# Patient Record
Sex: MALE | Race: OTHER | Employment: PART TIME | URBAN - METROPOLITAN AREA
[De-identification: names, ages, dates, MRNs, and addresses within clinical notes are randomized per-mention and may not be internally consistent; named-entity substitution may affect disease eponyms.]

---

## 2021-11-07 ENCOUNTER — APPOINTMENT (OUTPATIENT)
Dept: CT IMAGING | Age: 21
End: 2021-11-07
Payer: COMMERCIAL

## 2021-11-07 ENCOUNTER — HOSPITAL ENCOUNTER (EMERGENCY)
Age: 21
Discharge: HOME | End: 2021-11-07
Attending: EMERGENCY MEDICINE
Payer: COMMERCIAL

## 2021-11-07 VITALS
WEIGHT: 165 LBS | HEIGHT: 70 IN | BODY MASS INDEX: 23.62 KG/M2 | DIASTOLIC BLOOD PRESSURE: 78 MMHG | SYSTOLIC BLOOD PRESSURE: 137 MMHG | OXYGEN SATURATION: 94 % | RESPIRATION RATE: 14 BRPM | HEART RATE: 88 BPM | TEMPERATURE: 98.7 F

## 2021-11-07 DIAGNOSIS — R11.2 NON-INTRACTABLE VOMITING WITH NAUSEA, UNSPECIFIED VOMITING TYPE: ICD-10-CM

## 2021-11-07 DIAGNOSIS — R51.9 ACUTE NONINTRACTABLE HEADACHE, UNSPECIFIED HEADACHE TYPE: Primary | ICD-10-CM

## 2021-11-07 LAB
A/G RATIO: 1.6 (ref 1.1–2.2)
ALBUMIN SERPL-MCNC: 5.1 G/DL (ref 3.4–5)
ALP BLD-CCNC: 75 U/L (ref 40–129)
ALT SERPL-CCNC: 27 U/L (ref 10–40)
AMPHETAMINE SCREEN, URINE: POSITIVE
ANION GAP SERPL CALCULATED.3IONS-SCNC: 13 MMOL/L (ref 3–16)
AST SERPL-CCNC: 21 U/L (ref 15–37)
BACTERIA: ABNORMAL /HPF
BARBITURATE SCREEN URINE: ABNORMAL
BASE EXCESS VENOUS: 2.1 MMOL/L (ref -3–3)
BASOPHILS ABSOLUTE: 0 K/UL (ref 0–0.2)
BASOPHILS RELATIVE PERCENT: 0.1 %
BENZODIAZEPINE SCREEN, URINE: ABNORMAL
BILIRUB SERPL-MCNC: 0.6 MG/DL (ref 0–1)
BILIRUBIN URINE: NEGATIVE
BLOOD, URINE: NEGATIVE
BUN BLDV-MCNC: 19 MG/DL (ref 7–20)
CALCIUM SERPL-MCNC: 10.3 MG/DL (ref 8.3–10.6)
CANNABINOID SCREEN URINE: ABNORMAL
CHLORIDE BLD-SCNC: 101 MMOL/L (ref 99–110)
CLARITY: CLEAR
CO2: 25 MMOL/L (ref 21–32)
COCAINE METABOLITE SCREEN URINE: ABNORMAL
COLOR: YELLOW
CREAT SERPL-MCNC: 0.8 MG/DL (ref 0.9–1.3)
EOSINOPHILS ABSOLUTE: 0 K/UL (ref 0–0.6)
EOSINOPHILS RELATIVE PERCENT: 0 %
EPITHELIAL CELLS, UA: ABNORMAL /HPF (ref 0–5)
GFR AFRICAN AMERICAN: >60
GFR NON-AFRICAN AMERICAN: >60
GLUCOSE BLD-MCNC: 97 MG/DL (ref 70–99)
GLUCOSE URINE: NEGATIVE MG/DL
HCO3 VENOUS: 27.2 MMOL/L (ref 23–29)
HCT VFR BLD CALC: 44.6 % (ref 40.5–52.5)
HEMOGLOBIN: 15.8 G/DL (ref 13.5–17.5)
KETONES, URINE: >=80 MG/DL
LEUKOCYTE ESTERASE, URINE: NEGATIVE
LYMPHOCYTES ABSOLUTE: 1.1 K/UL (ref 1–5.1)
LYMPHOCYTES RELATIVE PERCENT: 11.2 %
Lab: ABNORMAL
MCH RBC QN AUTO: 29.4 PG (ref 26–34)
MCHC RBC AUTO-ENTMCNC: 35.4 G/DL (ref 31–36)
MCV RBC AUTO: 83.2 FL (ref 80–100)
METHADONE SCREEN, URINE: ABNORMAL
MICROSCOPIC EXAMINATION: YES
MONOCYTES ABSOLUTE: 0.6 K/UL (ref 0–1.3)
MONOCYTES RELATIVE PERCENT: 5.7 %
MUCUS: ABNORMAL /LPF
NEUTROPHILS ABSOLUTE: 8.3 K/UL (ref 1.7–7.7)
NEUTROPHILS RELATIVE PERCENT: 83 %
NITRITE, URINE: NEGATIVE
O2 SAT, VEN: 65 %
O2 THERAPY: NORMAL
OPIATE SCREEN URINE: ABNORMAL
OXYCODONE URINE: ABNORMAL
PCO2, VEN: 45.8 MMHG (ref 40–50)
PDW BLD-RTO: 12.7 % (ref 12.4–15.4)
PH UA: 6.5
PH UA: 6.5 (ref 5–8)
PH VENOUS: 7.38 (ref 7.35–7.45)
PHENCYCLIDINE SCREEN URINE: ABNORMAL
PLATELET # BLD: 259 K/UL (ref 135–450)
PMV BLD AUTO: 7.9 FL (ref 5–10.5)
PO2, VEN: 34.6 MMHG (ref 25–40)
POTASSIUM REFLEX MAGNESIUM: 4.1 MMOL/L (ref 3.5–5.1)
PROPOXYPHENE SCREEN: ABNORMAL
PROTEIN UA: ABNORMAL MG/DL
RBC # BLD: 5.37 M/UL (ref 4.2–5.9)
RBC UA: ABNORMAL /HPF (ref 0–4)
SODIUM BLD-SCNC: 139 MMOL/L (ref 136–145)
SPECIFIC GRAVITY UA: 1.02 (ref 1–1.03)
TCO2 CALC VENOUS: 28 MMOL/L
TOTAL PROTEIN: 8.3 G/DL (ref 6.4–8.2)
URINE TYPE: ABNORMAL
UROBILINOGEN, URINE: 0.2 E.U./DL
WBC # BLD: 10 K/UL (ref 4–11)
WBC UA: ABNORMAL /HPF (ref 0–5)

## 2021-11-07 PROCEDURE — 2580000003 HC RX 258: Performed by: EMERGENCY MEDICINE

## 2021-11-07 PROCEDURE — 85025 COMPLETE CBC W/AUTO DIFF WBC: CPT

## 2021-11-07 PROCEDURE — 81001 URINALYSIS AUTO W/SCOPE: CPT

## 2021-11-07 PROCEDURE — 80053 COMPREHEN METABOLIC PANEL: CPT

## 2021-11-07 PROCEDURE — 99282 EMERGENCY DEPT VISIT SF MDM: CPT

## 2021-11-07 PROCEDURE — 96374 THER/PROPH/DIAG INJ IV PUSH: CPT

## 2021-11-07 PROCEDURE — 70450 CT HEAD/BRAIN W/O DYE: CPT

## 2021-11-07 PROCEDURE — 6360000002 HC RX W HCPCS: Performed by: EMERGENCY MEDICINE

## 2021-11-07 PROCEDURE — 82803 BLOOD GASES ANY COMBINATION: CPT

## 2021-11-07 PROCEDURE — 80307 DRUG TEST PRSMV CHEM ANLYZR: CPT

## 2021-11-07 RX ORDER — METOCLOPRAMIDE HYDROCHLORIDE 5 MG/ML
10 INJECTION INTRAMUSCULAR; INTRAVENOUS ONCE
Status: COMPLETED | OUTPATIENT
Start: 2021-11-07 | End: 2021-11-07

## 2021-11-07 RX ORDER — 0.9 % SODIUM CHLORIDE 0.9 %
1000 INTRAVENOUS SOLUTION INTRAVENOUS ONCE
Status: COMPLETED | OUTPATIENT
Start: 2021-11-07 | End: 2021-11-07

## 2021-11-07 RX ADMIN — SODIUM CHLORIDE 1000 ML: 9 INJECTION, SOLUTION INTRAVENOUS at 16:33

## 2021-11-07 RX ADMIN — METOCLOPRAMIDE 10 MG: 5 INJECTION, SOLUTION INTRAMUSCULAR; INTRAVENOUS at 16:33

## 2021-11-07 ASSESSMENT — ENCOUNTER SYMPTOMS
COUGH: 0
PHOTOPHOBIA: 0
VOMITING: 1
EYE PAIN: 0
NAUSEA: 1
DIARRHEA: 0
EYE REDNESS: 0
ABDOMINAL PAIN: 0
SHORTNESS OF BREATH: 0

## 2021-11-07 NOTE — Clinical Note
Yanira Nuria accompanied Cecy Tilley to the emergency department on 11/7/2021. They may return to work on 11/08/2021. If you have any questions or concerns, please don't hesitate to call.       Power Jorge MD

## 2021-11-07 NOTE — ED TRIAGE NOTES
Reports has bloody nose and head Ache a week and half ago s/s ongoing now reports fatigue, blurry vision, confusion. Drinking last night reports more than a hang over today also has random groin pain.  NV today

## 2021-11-08 NOTE — ED PROVIDER NOTES
Emergency Department Provider Note  Location: [address] [place] [address]  11/7/2021     Patient Identification  Stephenie Dickens is a 24 y.o. male    Chief Complaint  Headache      Mode of Arrival  private car    HPI  (History provided by patient)  This is a 24 y.o. male presented today for headache. For 1.5 weeks, patient has had intermittent headache, blurry vision and confusion. He gave an example where he went to [employer] on Tuesday and walked out of there with only 2 items but that shopping trip took him 45 minutes. He knows the store well and would normally be in and out if he was only buying 2 items. He felt like he was just wandering in the store which is not normal for him. He takes Adderall for ADHD and denies medication changes. He states prior to onset of the HA, he had a \"chest cold\" and severe cough. Those URI symptoms resolved and then HA and confusion started. Patient said there was a gathering last night and drank a lot of alcohol. He started vomiting overnight and this morning felt very hung over. He woke up with a headache and continues to vomit. Patient states he gets headache often but they are not usually this severe. Denies drugs. He also states for the past 1.5 weeks, he has had 3 episode of epistaxis, all on the right side. Patient also mentioned that he has had left testicular pain off and on for months. He states the episode for last a few seconds and this occurs about once a month. No penile discharge. No urinary symptoms. Denies pain ever lasting more than 15 seconds. ROS  Review of Systems   Constitutional: Positive for fatigue. Negative for chills and fever. HENT: Positive for nosebleeds. Negative for congestion. Eyes: Positive for visual disturbance. Negative for photophobia, pain and redness. Respiratory: Negative for cough and shortness of breath. Cardiovascular: Negative for chest pain. Gastrointestinal: Positive for nausea and vomiting.  Negative for abdominal pain and diarrhea. Genitourinary: Negative for dysuria and frequency. Musculoskeletal: Negative for neck pain. Skin: Negative for rash. Neurological: Positive for headaches. Negative for syncope and speech difficulty. Psychiatric/Behavioral: Positive for confusion. I have reviewed the following nursing documentation:  Allergies: No Known Allergies    Past medical history:  has a past medical history of ADHD and Seizures ([facility] [address] Past surgical history:  has no past surgical history on file. Home medications: Adderall     Social history:  reports that he has never smoked. He has never used smokeless tobacco. He reports previous alcohol use. He reports current drug use. Drug: Marijuana [place]). Family history:  History reviewed. No pertinent family history. Exam  ED Triage Vitals   BP Temp Temp Source Pulse Resp SpO2 Height Weight   11/07/21 1430 11/07/21 1430 11/07/21 1430 11/07/21 1430 11/07/21 1430 11/07/21 1430 11/07/21 1439 11/07/21 1439   (!) 144/97 98.7 °F (37.1 °C) Oral 86 14 100 % 5' 10\" (1.778 m) 165 lb (74.8 kg)   Physical Exam  Vitals and nursing note reviewed. Constitutional:       General: He is not in acute distress. Appearance: He is well-developed. He is not diaphoretic. HENT:      Head: Normocephalic and atraumatic. Eyes:      General: No visual field deficit or scleral icterus. Right eye: No discharge. Left eye: No discharge. Extraocular Movements: Extraocular movements intact. Conjunctiva/sclera: Conjunctivae normal.      Pupils: Pupils are equal, round, and reactive to light. Neck:      Trachea: No tracheal deviation. Cardiovascular:      Rate and Rhythm: Normal rate and regular rhythm. Heart sounds: Normal heart sounds. No murmur heard. Pulmonary:      Effort: Pulmonary effort is normal. No respiratory distress. Breath sounds: Normal breath sounds. No stridor. No wheezing.    Abdominal:      General: There is no distension. Palpations: Abdomen is soft. Tenderness: There is no abdominal tenderness. There is no guarding or rebound. Musculoskeletal:         General: No deformity. Cervical back: Neck supple. Right lower leg: No edema. Left lower leg: No edema. Skin:     General: Skin is warm and dry. Findings: No erythema or rash. Neurological:      Mental Status: He is alert and oriented to person, place, and time. Cranial Nerves: Cranial nerves are intact. No dysarthria or facial asymmetry. Sensory: No sensory deficit. Motor: No weakness, tremor or abnormal muscle tone. Coordination: Coordination normal.      Gait: Gait normal.   Psychiatric:         Speech: Speech normal.         Behavior: Behavior normal.           MDM/ED Course  ED Medication Orders (From admission, onward)    Start Ordered     Status Ordering Provider    11/07/21 1630 11/07/21 1621  metoclopramide (REGLAN) injection 10 mg  ONCE         Last MAR action: Given - by Jennie Zimmer on 11/07/21 at [facility]    11/07/21 1630 11/07/21 1621  0.9 % sodium chloride bolus  ONCE         Last MAR action: Stopped - by Jennie Zimmer on 11/07/21 at [address] [place] [address]          Radiology  CT HEAD WO CONTRAST    Result Date: 11/7/2021  PROCEDURE: CT HEAD WO CONTRAST INDICATION: headache x 1.5 weeks, confusion, now vomiting in the past 12 hours, stroke-like symptoms COMPARISON: none TECHNICAL FACTORS: Multiplanar contiguous spiral axial scanning  Skull base to high convexities with multi-reformatted images. CT radiation dose optimization techniques (automated exposure control, use of a iterative reconstruction techniques, or adjustment of the mA or KV according to the patients size) were used to limit patient radiation dose. FINDINGS: Noncontrast axial images reveal midline ventricles. Ventricular size and cortical sulci are normal.  No intra-axial/extra-axial masses or fluid collections.  No evidence of acute cranial hemorrhage. Posterior fossa is within normal limits Paranasal sinuses are unremarkable. Orbits: Normal Mastoids and Temporal Bones: Normal Skull base and Bony calvarium remains intact, no destructive lesions     1.  Normal brain        Labs  Results for orders placed or performed during the hospital encounter of 11/07/21   CBC Auto Differential   Result Value Ref Range    WBC 10.0 4.0 - 11.0 K/uL    RBC 5.37 4.20 - 5.90 M/uL    Hemoglobin 15.8 13.5 - 17.5 g/dL    Hematocrit 44.6 40.5 - 52.5 %    MCV 83.2 80.0 - 100.0 fL    MCH 29.4 26.0 - 34.0 pg    MCHC 35.4 31.0 - 36.0 g/dL    RDW 12.7 12.4 - 15.4 %    Platelets 744 047 - 847 K/uL    MPV 7.9 5.0 - 10.5 fL    Neutrophils % 83.0 %    Lymphocytes % 11.2 %    Monocytes % 5.7 %    Eosinophils % 0.0 %    Basophils % 0.1 %    Neutrophils Absolute 8.3 (H) 1.7 - 7.7 K/uL    Lymphocytes Absolute 1.1 1.0 - 5.1 K/uL    Monocytes Absolute 0.6 0.0 - 1.3 K/uL    Eosinophils Absolute 0.0 0.0 - 0.6 K/uL    Basophils Absolute 0.0 0.0 - 0.2 K/uL   Comprehensive Metabolic Panel w/ Reflex to MG   Result Value Ref Range    Sodium 139 136 - 145 mmol/L    Potassium reflex Magnesium 4.1 3.5 - 5.1 mmol/L    Chloride 101 99 - 110 mmol/L    CO2 25 21 - 32 mmol/L    Anion Gap 13 3 - 16    Glucose 97 70 - 99 mg/dL    BUN 19 7 - 20 mg/dL    CREATININE 0.8 (L) 0.9 - 1.3 mg/dL    GFR Non-African American >60 >60    GFR African American >60 >60    Calcium 10.3 8.3 - 10.6 mg/dL    Total Protein 8.3 (H) 6.4 - 8.2 g/dL    Albumin 5.1 (H) 3.4 - 5.0 g/dL    Albumin/Globulin Ratio 1.6 1.1 - 2.2    Total Bilirubin 0.6 0.0 - 1.0 mg/dL    Alkaline Phosphatase 75 40 - 129 U/L    ALT 27 10 - 40 U/L    AST 21 15 - 37 U/L   Urinalysis, reflex to microscopic   Result Value Ref Range    Color, UA Yellow Straw/Yellow    Clarity, UA Clear Clear    Glucose, Ur Negative Negative mg/dL    Bilirubin Urine Negative Negative    Ketones, Urine >=80 (A) Negative mg/dL    Specific Gravity, UA 1.025 1.005 - did not show acute pathology. We discussed the risks and benefits of LP and shared decision making was made. We agreed that LP is not likely to be beneficial at this time. Patient also felt better and was ready to go home. - Patient advised to f/u with PCP and mother present. She agrees to take the patient to their PCP in [place]. - Return precautions also discussed. patient verbalized understanding of care plan and agreed to follow-up with PCP as advised. I estimate there is LOW risk for SUBARACHNOID HEMORRHAGE, MENINGITIS, INTRACRANIAL HEMORRHAGE, SUBDURAL HEMATOMA, OR STROKE, thus I consider the discharge disposition reasonable. Wing Travis and I have discussed the diagnosis and risks, and we agree with discharging home to follow-up with their primary doctor. We also discussed returning to the Emergency Department immediately if new or worsening symptoms occur. We have discussed the symptoms which are most concerning (e.g., changing or worsening pain, weakness, vomiting, fever) that necessitate immediate return. Clinical Impression:  1. Acute nonintractable headache, unspecified headache type    2. Non-intractable vomiting with nausea, unspecified vomiting type          Disposition:  Discharge to home in good condition. Blood pressure 137/78, pulse 88, temperature 98.7 °F (37.1 °C), temperature source Oral, resp. rate 14, height 5' 10\" (1.778 m), weight 165 lb (74.8 kg), SpO2 94 %. Patient was given scripts for the following medications. I counseled patient how to take these medications. There are no discharge medications for this patient. Disposition referral (if applicable):   Your primary care doctor in [place]    Schedule an appointment as soon as possible for a visit in 3 days         This chart was generated in part by using Dragon Dictation system and may contain errors related to that system including errors in grammar, punctuation, and spelling, as well as words and phrases that may be inappropriate. If there are any questions or concerns please feel free to contact the dictating provider for clarification.      Eliud Roth MD  [facility] Quentin Sever, MD  11/07/21 1659

## 2024-08-28 ENCOUNTER — HOSPITAL ENCOUNTER (EMERGENCY)
Age: 24
Discharge: HOME | End: 2024-08-28
Attending: EMERGENCY MEDICINE
Payer: COMMERCIAL

## 2024-08-28 VITALS
HEIGHT: 70 IN | WEIGHT: 158.51 LBS | RESPIRATION RATE: 16 BRPM | HEART RATE: 61 BPM | OXYGEN SATURATION: 98 % | DIASTOLIC BLOOD PRESSURE: 72 MMHG | SYSTOLIC BLOOD PRESSURE: 127 MMHG | BODY MASS INDEX: 22.69 KG/M2 | TEMPERATURE: 98.9 F

## 2024-08-28 DIAGNOSIS — Z20.9 EXPOSURE TO BAT WITHOUT KNOWN BITE: Primary | ICD-10-CM

## 2024-08-28 DIAGNOSIS — Z91.89 AT INCREASED RISK FOR EXPOSURE TO RABIES VIRUS: Primary | ICD-10-CM

## 2024-08-28 DIAGNOSIS — Z23 NEED FOR PROPHYLACTIC VACCINATION AND INOCULATION AGAINST RABIES: ICD-10-CM

## 2024-08-28 PROCEDURE — 90471 IMMUNIZATION ADMIN: CPT | Performed by: EMERGENCY MEDICINE

## 2024-08-28 PROCEDURE — 90675 RABIES VACCINE IM: CPT | Performed by: EMERGENCY MEDICINE

## 2024-08-28 PROCEDURE — 6360000002 HC RX W HCPCS: Performed by: EMERGENCY MEDICINE

## 2024-08-28 PROCEDURE — 90375 RABIES IG IM/SC: CPT | Performed by: EMERGENCY MEDICINE

## 2024-08-28 PROCEDURE — 96372 THER/PROPH/DIAG INJ SC/IM: CPT

## 2024-08-28 PROCEDURE — 99284 EMERGENCY DEPT VISIT MOD MDM: CPT

## 2024-08-28 RX ORDER — ONDANSETRON 2 MG/ML
8 INJECTION INTRAMUSCULAR; INTRAVENOUS
OUTPATIENT
Start: 2024-09-01

## 2024-08-28 RX ORDER — ACETAMINOPHEN 325 MG/1
650 TABLET ORAL
OUTPATIENT
Start: 2024-09-01

## 2024-08-28 RX ORDER — DIPHENHYDRAMINE HYDROCHLORIDE 50 MG/ML
50 INJECTION INTRAMUSCULAR; INTRAVENOUS
OUTPATIENT
Start: 2024-09-01

## 2024-08-28 RX ORDER — EPINEPHRINE 1 MG/ML
0.3 INJECTION, SOLUTION INTRAMUSCULAR; SUBCUTANEOUS PRN
OUTPATIENT
Start: 2024-09-01

## 2024-08-28 RX ORDER — SODIUM CHLORIDE 9 MG/ML
INJECTION, SOLUTION INTRAVENOUS CONTINUOUS
OUTPATIENT
Start: 2024-09-01

## 2024-08-28 RX ORDER — ALBUTEROL SULFATE 90 UG/1
4 AEROSOL, METERED RESPIRATORY (INHALATION) PRN
OUTPATIENT
Start: 2024-09-01

## 2024-08-28 RX ADMIN — RABIES VACCINE 1 ML: KIT at 15:28

## 2024-08-28 RX ADMIN — RABIES IMMUNE GLOBULIN (HUMAN) 1440 UNITS: 300 INJECTION, SOLUTION INFILTRATION; INTRAMUSCULAR at 15:19

## 2024-08-28 ASSESSMENT — ENCOUNTER SYMPTOMS
SORE THROAT: 0
STRIDOR: 0
ABDOMINAL DISTENTION: 0
NAUSEA: 0
VOICE CHANGE: 0
EYE DISCHARGE: 0
EYE REDNESS: 0
COUGH: 0
VOMITING: 0
EYE PAIN: 0
EYE ITCHING: 0
ANAL BLEEDING: 0
ABDOMINAL PAIN: 0
WHEEZING: 0
CHEST TIGHTNESS: 0
BLOOD IN STOOL: 0
FACIAL SWELLING: 0
BACK PAIN: 0
SINUS PRESSURE: 0
TROUBLE SWALLOWING: 0
CONSTIPATION: 0
RHINORRHEA: 0
SHORTNESS OF BREATH: 0
PHOTOPHOBIA: 0
DIARRHEA: 0

## 2024-08-28 ASSESSMENT — PAIN - FUNCTIONAL ASSESSMENT: PAIN_FUNCTIONAL_ASSESSMENT: NONE - DENIES PAIN

## 2024-08-28 NOTE — DISCHARGE INSTRUCTIONS
Follow up with the [facility] infusion center on Day 3, 7, and according to the schedule given for additional vaccinations.   Return if any problems or concerns.

## 2024-08-28 NOTE — ED NOTES
Faxed over rabies protocol form to the [facility]    Confirmed that they received the form.   
Instructed patient to call [facility] outpatient infusion center regarding next series of vaccines   
13-Jun-2019 19:11

## 2024-08-28 NOTE — ED PROVIDER NOTES
tinnitus, trouble swallowing and voice change.    Eyes:  Negative for photophobia, pain, discharge, redness, itching and visual disturbance.   Respiratory:  Negative for cough, chest tightness, shortness of breath, wheezing and stridor.    Cardiovascular:  Negative for chest pain, palpitations and leg swelling.   Gastrointestinal:  Negative for abdominal distention, abdominal pain, anal bleeding, blood in stool, constipation, diarrhea, nausea and vomiting.   Genitourinary:  Negative for difficulty urinating, dysuria, frequency, hematuria, penile discharge, testicular pain and urgency.   Musculoskeletal:  Negative for back pain, joint swelling, neck pain and neck stiffness.   Skin:  Negative for rash and wound.   Neurological:  Negative for dizziness, syncope, facial asymmetry, speech difficulty, weakness, numbness and headaches.   Hematological:  Does not bruise/bleed easily.   Psychiatric/Behavioral:  Negative for agitation, confusion, hallucinations, self-injury, sleep disturbance and suicidal ideas. The patient is not nervous/anxious.    All other systems reviewed and are negative.       Physical Exam  Constitutional:       General: He is not in acute distress.     Appearance: Normal appearance. He is normal weight. He is not ill-appearing.   HENT:      Head: Normocephalic and atraumatic.   Eyes:      Extraocular Movements: Extraocular movements intact.      Conjunctiva/sclera: Conjunctivae normal.      Pupils: Pupils are equal, round, and reactive to light.   Pulmonary:      Effort: Pulmonary effort is normal. No respiratory distress.   Musculoskeletal:         General: Normal range of motion.   Skin:     General: Skin is warm and dry.      Capillary Refill: Capillary refill takes less than 2 seconds.   Neurological:      General: No focal deficit present.      Mental Status: He is alert.   Psychiatric:         Mood and Affect: Mood normal.         Behavior: Behavior normal.          MDM  No results found for  this visit on 08/28/24.      I estimate there is LOW risk for CELLULITIS, COMPARTMENT SYNDROME, NECROTIZING FASCIITIS, TENDON OR NEUROVASCULAR INJURY, or FOREIGN BODY, thus I consider the discharge disposition reasonable. Also, there is no evidence or peritonitis, sepsis, or toxicity. Pino Yarbrough and I have discussed the diagnosis and risks, and we agree with discharging home to follow-up with their primary doctor. We also discussed returning to the Emergency Department immediately if new or worsening symptoms occur. We have discussed the symptoms which are most concerning (e.g., changing or worsening pain, fever, numbness, weakness, cool or painful digits) that necessitate immediate return.  Final Impression    1. Exposure to bat without known bite    2. Need for prophylactic vaccination and inoculation against rabies        Discharge Vital Signs:  Blood pressure 127/72, pulse 61, temperature 98.9 °F (37.2 °C), temperature source Oral, resp. rate 16, height 1.778 m (5' 10\"), weight 71.9 kg (158 lb 8.2 oz), SpO2 98%.           Chrissie Jones MD  08/28/24 [id number]

## 2024-09-03 ENCOUNTER — HOSPITAL ENCOUNTER (OUTPATIENT)
Dept: ONCOLOGY | Age: 24
Setting detail: INFUSION SERIES
Discharge: HOME | End: 2024-09-03
Payer: COMMERCIAL

## 2024-09-03 VITALS
TEMPERATURE: 98.3 F | RESPIRATION RATE: 16 BRPM | DIASTOLIC BLOOD PRESSURE: 71 MMHG | HEART RATE: 68 BPM | OXYGEN SATURATION: 98 % | SYSTOLIC BLOOD PRESSURE: 138 MMHG

## 2024-09-03 DIAGNOSIS — Z91.89 AT INCREASED RISK FOR EXPOSURE TO RABIES VIRUS: Primary | ICD-10-CM

## 2024-09-03 PROCEDURE — 6360000002 HC RX W HCPCS: Performed by: EMERGENCY MEDICINE

## 2024-09-03 PROCEDURE — 90675 RABIES VACCINE IM: CPT | Performed by: EMERGENCY MEDICINE

## 2024-09-03 PROCEDURE — 90471 IMMUNIZATION ADMIN: CPT | Performed by: EMERGENCY MEDICINE

## 2024-09-03 PROCEDURE — 96372 THER/PROPH/DIAG INJ SC/IM: CPT

## 2024-09-03 RX ORDER — ALBUTEROL SULFATE 90 UG/1
4 AEROSOL, METERED RESPIRATORY (INHALATION) PRN
OUTPATIENT
Start: 2024-09-07

## 2024-09-03 RX ORDER — DIPHENHYDRAMINE HYDROCHLORIDE 50 MG/ML
50 INJECTION INTRAMUSCULAR; INTRAVENOUS
OUTPATIENT
Start: 2024-09-07

## 2024-09-03 RX ORDER — SODIUM CHLORIDE 9 MG/ML
INJECTION, SOLUTION INTRAVENOUS CONTINUOUS
OUTPATIENT
Start: 2024-09-07

## 2024-09-03 RX ORDER — EPINEPHRINE 1 MG/ML
0.3 INJECTION, SOLUTION INTRAMUSCULAR; SUBCUTANEOUS PRN
OUTPATIENT
Start: 2024-09-07

## 2024-09-03 RX ORDER — ONDANSETRON 2 MG/ML
8 INJECTION INTRAMUSCULAR; INTRAVENOUS
OUTPATIENT
Start: 2024-09-07

## 2024-09-03 RX ORDER — ACETAMINOPHEN 325 MG/1
650 TABLET ORAL
OUTPATIENT
Start: 2024-09-07

## 2024-09-03 RX ADMIN — RABIES VACCINE 1 ML: KIT at 14:05

## 2024-09-03 NOTE — PROGRESS NOTES
Patient seen today for rabies vaccine post-exposure per orders from Chrissie Jones MD. Patient tolerated well and discharged ambulatory to home. Patient educated on potential side effects with no further questions.

## 2024-09-07 ENCOUNTER — HOSPITAL ENCOUNTER (OUTPATIENT)
Dept: ONCOLOGY | Age: 24
Setting detail: INFUSION SERIES
Discharge: HOME | End: 2024-09-07
Payer: COMMERCIAL

## 2024-09-07 VITALS
TEMPERATURE: 97.4 F | OXYGEN SATURATION: 97 % | SYSTOLIC BLOOD PRESSURE: 136 MMHG | HEART RATE: 69 BPM | RESPIRATION RATE: 16 BRPM | DIASTOLIC BLOOD PRESSURE: 73 MMHG

## 2024-09-07 DIAGNOSIS — Z91.89 AT INCREASED RISK FOR EXPOSURE TO RABIES VIRUS: Primary | ICD-10-CM

## 2024-09-07 PROCEDURE — 90675 RABIES VACCINE IM: CPT | Performed by: EMERGENCY MEDICINE

## 2024-09-07 PROCEDURE — 90471 IMMUNIZATION ADMIN: CPT | Performed by: EMERGENCY MEDICINE

## 2024-09-07 PROCEDURE — 6360000002 HC RX W HCPCS: Performed by: EMERGENCY MEDICINE

## 2024-09-07 PROCEDURE — 96372 THER/PROPH/DIAG INJ SC/IM: CPT

## 2024-09-07 RX ORDER — ONDANSETRON 2 MG/ML
8 INJECTION INTRAMUSCULAR; INTRAVENOUS
OUTPATIENT
Start: 2024-09-14

## 2024-09-07 RX ORDER — EPINEPHRINE 1 MG/ML
0.3 INJECTION, SOLUTION INTRAMUSCULAR; SUBCUTANEOUS PRN
OUTPATIENT
Start: 2024-09-14

## 2024-09-07 RX ORDER — SODIUM CHLORIDE 9 MG/ML
INJECTION, SOLUTION INTRAVENOUS CONTINUOUS
OUTPATIENT
Start: 2024-09-14

## 2024-09-07 RX ORDER — ALBUTEROL SULFATE 90 UG/1
4 AEROSOL, METERED RESPIRATORY (INHALATION) PRN
OUTPATIENT
Start: 2024-09-14

## 2024-09-07 RX ORDER — ACETAMINOPHEN 325 MG/1
650 TABLET ORAL
OUTPATIENT
Start: 2024-09-14

## 2024-09-07 RX ORDER — DIPHENHYDRAMINE HYDROCHLORIDE 50 MG/ML
50 INJECTION INTRAMUSCULAR; INTRAVENOUS
OUTPATIENT
Start: 2024-09-14

## 2024-09-07 RX ADMIN — RABIES VACCINE 1 ML: KIT at 09:27

## 2024-09-07 NOTE — PROGRESS NOTES
Patient seen in [facility] for Rabies Injection.  Wound site without redness, swelling or drainage.   Denies pain at site.  Tolerated injection well.  Verbalized understanding of d/c instructions.  Discharged ambulatory.      Adrian Wen RN

## 2024-09-14 ENCOUNTER — HOSPITAL ENCOUNTER (OUTPATIENT)
Dept: ONCOLOGY | Age: 24
Setting detail: INFUSION SERIES
Discharge: HOME | End: 2024-09-14
Payer: COMMERCIAL

## 2024-09-14 VITALS
RESPIRATION RATE: 16 BRPM | HEART RATE: 85 BPM | TEMPERATURE: 98 F | OXYGEN SATURATION: 98 % | DIASTOLIC BLOOD PRESSURE: 78 MMHG | SYSTOLIC BLOOD PRESSURE: 115 MMHG

## 2024-09-14 DIAGNOSIS — Z91.89 AT INCREASED RISK FOR EXPOSURE TO RABIES VIRUS: Primary | ICD-10-CM

## 2024-09-14 PROCEDURE — 96372 THER/PROPH/DIAG INJ SC/IM: CPT

## 2024-09-14 PROCEDURE — 90471 IMMUNIZATION ADMIN: CPT | Performed by: EMERGENCY MEDICINE

## 2024-09-14 PROCEDURE — 90675 RABIES VACCINE IM: CPT | Performed by: EMERGENCY MEDICINE

## 2024-09-14 PROCEDURE — 6360000002 HC RX W HCPCS: Performed by: EMERGENCY MEDICINE

## 2024-09-14 RX ORDER — ACETAMINOPHEN 325 MG/1
650 TABLET ORAL
OUTPATIENT
Start: 2024-09-14

## 2024-09-14 RX ORDER — DIPHENHYDRAMINE HYDROCHLORIDE 50 MG/ML
50 INJECTION INTRAMUSCULAR; INTRAVENOUS
OUTPATIENT
Start: 2024-09-14

## 2024-09-14 RX ORDER — EPINEPHRINE 1 MG/ML
0.3 INJECTION, SOLUTION INTRAMUSCULAR; SUBCUTANEOUS PRN
OUTPATIENT
Start: 2024-09-14

## 2024-09-14 RX ORDER — ALBUTEROL SULFATE 90 UG/1
4 AEROSOL, METERED RESPIRATORY (INHALATION) PRN
OUTPATIENT
Start: 2024-09-14

## 2024-09-14 RX ORDER — SODIUM CHLORIDE 9 MG/ML
INJECTION, SOLUTION INTRAVENOUS CONTINUOUS
OUTPATIENT
Start: 2024-09-14

## 2024-09-14 RX ORDER — ONDANSETRON 2 MG/ML
8 INJECTION INTRAMUSCULAR; INTRAVENOUS
OUTPATIENT
Start: 2024-09-14

## 2024-09-14 RX ADMIN — RABIES VACCINE 1 ML: KIT at 08:52
